# Patient Record
(demographics unavailable — no encounter records)

---

## 2024-11-15 NOTE — DISCUSSION/SUMMARY
[de-identified] : Patient allowed to gently start resuming activities. Discussed change to medication prescription and usage. Offered cortisone steroid injection. Bracing options discussed with patient. Hyaluronic Acid inj pamphlet given to pt. try topical lidocaine reviewed current medications being used by this patient   11/15/2024  RE:  MYA SALAZAR   Acct #- [id number]  Attention:  Nurse Reviewer /Medical Director  I am writing this letter as a medical necessity for HA euflexxa both knees Patient has tried analgesics, non-steroid anti-inflammatory agents,  physical therapy, hot or cold compresses,injections of corticosteroids, etc)  which in combination or by themselves has not worked. Based on my patient's condition, I strongly believe that the Hyaluronic aid injections is medically needed.   Thank you for your time and consideration.      Euflexxa today.

## 2024-11-15 NOTE — PROCEDURE
[FreeTextEntry3] : Euflexxa (Large Joint) with Ultrasound Guidance Viscosupplementation Injection: X-ray evidence of Osteoarthritis on this or prior visit and Patient has tried OTC's including aspirin, Ibuprofen, Aleve etc or prescription NSAIDS, and/or exercises at home and/ or physical therapy without satisfactory response.  An injection of Euflexxa 2ml #1 was injected into the bilat knee(s). after verbal consent using sterile technique. The risks, benefits, and alternatives to Viscosupplementation injection were explained in full to the patient. Risks outlined include but are not limited to infection, sepsis, bleeding, scarring, skin discoloration, temporary increase in pain, syncopal episode, failure to resolve symptoms, allergic reaction, and symptom recurrence. Signs and symptoms of infection reviewed and patient advised to call immediately for redness, fevers, and/or chills. Patient understood the risks. All questions were answered. After discussion of options, patient requested Viscosupplementation. Oral informed consent was obtained and sterile prep was done of the injection site. Sterile technique was used without complications. The patient tolerated the procedure well. Ice tonight to the injection site.   Ultrasound Guidance was used for the following reasons: altered anatomic landmarks because of erosive arthritis.   Ultrasound guided injection was performed of the knee, visualization of the needle and placement of injection was performed without complication

## 2024-11-15 NOTE — HISTORY OF PRESENT ILLNESS
[4] : 4 [3] : 3 [Localized] : localized [Injection therapy] : injection therapy [Walking] : walking [de-identified] : Patient has increasing smyptoms in both knees. He has a history of OA, last zilretta for the rknee was in March and help until recently on no meds and uses a cane.   [] : no [FreeTextEntry1] : knees

## 2024-11-29 NOTE — HISTORY OF PRESENT ILLNESS
[2] : 2 [Euflexxa] : Euflexxa [Gradual] : gradual [Localized] : localized [Injection therapy] : injection therapy [de-identified] : Patient has increasing smyptoms in both knees. He has a history of OA, last zilretta for the rknee was in March and help until recently on no meds and uses a cane. Tolerating Euflexxa well, no adverse reactions.   [] : no [FreeTextEntry1] : knees [FreeTextEntry5] : left knee is a 0 rt knee is a 3 [de-identified] : 11-15-24 [de-identified] : knees

## 2024-11-29 NOTE — PHYSICAL EXAM
[Left] : left knee [Negative] : negative Jeffrey's [Right] : right knee [] : patient ambulates with assistive device [TWNoteComboBox7] : flexion 120 degrees [de-identified] : extension -5 degrees

## 2024-12-06 NOTE — DISCUSSION/SUMMARY
[de-identified] : rest, ice activity modification.    Frequency of visco and CSI discussed. Return in 6 weeks as needed

## 2024-12-06 NOTE — PHYSICAL EXAM
[Left] : left knee [Negative] : negative Jeffrey's [Right] : right knee [] : patient ambulates with assistive device [TWNoteComboBox7] : flexion 120 degrees [de-identified] : extension -5 degrees

## 2024-12-06 NOTE — HISTORY OF PRESENT ILLNESS
[3] : 3 [Euflexxa] : Euflexxa [Localized] : localized [Injection therapy] : injection therapy [de-identified] : Patient has increasing smyptoms in both knees. He has a history of OA, last zilretta for the rknee was in March and help until recently on no meds and uses a cane. Tolerating Euflexxa well, no adverse reactions.  Tolerating visco well.  [] : no [FreeTextEntry1] : knees/rt> left  [de-identified] : 11-29-24 [de-identified] : knees

## 2025-03-04 NOTE — PHYSICAL EXAM
Pt arrives to the ED as a walk-in due to headache and dizziness. Pt's mother states that he was playing soccer when he began experiencing symptoms. Pt states both of his ears hurt as well. Mom is also concerned bc during his sport's physical the MD stated that he heard a murmur. Pt was to get an EKG but has not received it yet.   [Left] : left knee [Right] : right knee [Negative] : negative Jeffrey's [] : patient ambulates with assistive device [TWNoteComboBox7] : flexion 120 degrees [de-identified] : extension -5 degrees

## 2025-03-21 NOTE — PHYSICAL EXAM
[Left] : left knee [Negative] : negative Jeffrey's [Right] : right knee [Bilateral] : hip bilaterally [AP] : anteroposterior [Moderate arthritis (Tonnis Grade 2)] : Moderate arthritis (Tonnis Grade 2) [de-identified] : external rotation 10 degrees [TWNoteComboBox6] : internal rotation 10 degrees [5___] : quadriceps 5[unfilled]/5 [] : patient ambulates with assistive device [TWNoteComboBox7] : flexion 120 degrees [de-identified] : extension -5 degrees

## 2025-03-21 NOTE — PROCEDURE
[FreeTextEntry3] : Large Joint Injection / Aspiration: Lidocaine, Zilretta Ultrasound and Guidance Lidocaine: 3 cc. Needle size: 18 gauge , 1.5 inch.  Zilretta: An Injection of Zilretta 32 Units 5ml ,  was injected into bilateral knees .   Ultrasound Guidance was used Ultrasound guided injection was performed of the knee, visualization of the needle and placement of injection was performed without complication.   Large Joint Injection was performed because of pain and inflammation. Anesthesia: ethyl chloride sprayed topically.. Patient has tried OTC's including aspirin, Ibuprofen, Aleve etc or prescription NSAIDS, and/or exercises at home and/ or physical therapy without satisfactory response. After verbal consent using sterile preparation and technique. The risks, benefits, and alternatives to aspiration were explained in full to the patient. Risks outlined include but are not limited to infection, sepsis, bleeding, scarring, skin discoloration, temporary increase in pain, syncopal episode, failure to resolve symptoms, allergic reaction and symptom recurrence. Patient understood the risks. All questions were answered. After discussion of options, patient requested an aspiration. Oral informed consent was obtained and sterile prep was done of the injection site. Sterile technique was utilized for the procedure including the preparation of the solutions used for the aspiration. Patient tolerated the procedure well. Advised to ice the injection site this evening. Prep with betadine locally to site. Sterile technique used. Patient tolerated procedure well. Post Procedure Instructions: Patient was advised to call if redness, pain, or fever occur and apply ice for 15 min. out of every hour for the next 12-24 hours as tolerated. patient was advised to rest the joint(s) for 1 days.

## 2025-03-21 NOTE — DISCUSSION/SUMMARY
[de-identified] : modify activities use elastic sleeve for structural support try OTC meds ice as needed try topical lidocaine for pain control reviewed current medications used by this patient home exercises for functional return can not take mobic 15 qday as on Plavix

## 2025-03-21 NOTE — HISTORY OF PRESENT ILLNESS
[Radiating] : radiating [Injection therapy] : injection therapy [Localized] : localized [3] : 3 [Euflexxa] : Euflexxa [de-identified] : Patient has increasing smyptoms in both knees. Some swelling on left knee, uses aelastic sleeve R knee and AFO's bilat. Completed Euflexxa Dec6  no signif help, hips bother him, wife feels as if he walks  differently [] : no [FreeTextEntry1] : knees/rt> left  [de-identified] : stretching [FreeTextEntry7] : both hips and l knee crack while stretching [de-identified] : 11-29-24 [de-identified] : knees

## 2025-06-20 NOTE — PROCEDURE
[Large Joint Injection] : Large joint injection [Left] : of the left [Knee] : knee [Pain] : pain [Inflammation] : inflammation [Alcohol] : alcohol [Betadine] : betadine [Ethyl Chloride sprayed topically] : ethyl chloride sprayed topically [___ cc    1%] : Lidocaine ~Vcc of 1%  [Effusion] : effusion [] : Patient tolerated procedure well [Call if redness, pain or fever occur] : call if redness, pain or fever occur [Apply ice for 15min out of every hour for the next 12-24 hours as tolerated] : apply ice for 15 minutes out of every hour for the next 12-24 hours as tolerated [Patient was advised to rest the joint(s) for ____ days] : patient was advised to rest the joint(s) for [unfilled] days [Previous OTC use and PT nontherapeutic] : patient has tried OTC's including aspirin, Ibuprofen, Aleve, etc or prescription NSAIDS, and/or exercises at home and/or physical therapy without satisfactory response [Patient had decreased mobility in the joint] : patient had decreased mobility in the joint [Risks, benefits, alternatives discussed / Verbal consent obtained] : the risks benefits, and alternatives have been discussed, and verbal consent was obtained [All ultrasound images have been permanently captured and stored accordingly in our picture archiving and communication system] : All ultrasound images have been permanently captured and stored accordingly in our picture archiving and communication system [de-identified] : 46 ccs [de-identified] : clear [FreeTextEntry3] : Supartz (Large Joint) with Ultrasound Guidance Viscosupplementation Injection: X-ray evidence of Osteoarthritis on this or prior visit and Patient has tried OTC's including aspirin, Ibuprofen, Aleve etc or prescription NSAIDS, and/or exercises at home and/ or physical therapy without satisfactory response.  An injection of Supartz 2.5ml #_1___ was injected into the bilateral knee(s). after verbal consent using sterile technique. The risks, benefits, and alternatives to Viscosupplementation injection were explained in full to the patient. Risks outlined include but are not limited to infection, sepsis, bleeding, scarring, skin discoloration, temporary increase in pain, syncopal episode, failure to resolve symptoms, allergic reaction, and symptom recurrence. Signs and symptoms of infection reviewed and patient advised to call immediately for redness, fevers, and/or chills. Patient understood the risks. All questions were answered. After discussion of options, patient requested Viscosupplementation. Oral informed consent was obtained and sterile prep was done of the injection site. Sterile technique was used without complications. The patient tolerated the procedure well. Ice tonight to the injection site.   Ultrasound Guidance was used for the following reasons: altered anatomic landmarks because of erosive arthritis.   Ultrasound guided injection was performed of the knee, visualization of the needle and placement of injection was performed without complication.

## 2025-06-20 NOTE — DISCUSSION/SUMMARY
[de-identified] : modify activities use elastic sleeve for structural support try OTC meds ice as needed try topical lidocaine for pain control reviewed current medications used by this patient home exercises for functional return  06/20/2025  RE:  MYA SALAZAR   [facility] #- [id number]  Attention:  Nurse Reviewer /Medical Director  I am writing this letter as a medical necessity for HA esupartz both knees Patient has tried analgesics, non-steroid anti-inflammatory agents,  physical therapy, hot or cold compresses,injections of corticosteroids, etc)  which in combination or by themselves has not worked. Based on my patient's condition, I strongly believe that the Hyaluronic aid injections is medically needed.   Thank you for your time and consideration.

## 2025-06-20 NOTE — HISTORY OF PRESENT ILLNESS
[Injection therapy] : injection therapy [0] : 0 [Localized] : localized [3] : 3 [Euflexxa] : Euflexxa [de-identified] : Patient has increasing symptoms in both knees. He has a history of OA, last zilretta for the R knee was in March and help until recently on no meds and uses a cane. Euflexxa did not help much, ,on no meds [] : no [FreeTextEntry1] : knees/rt> left  [de-identified] : 11-29-24 [de-identified] : knees

## 2025-06-20 NOTE — PHYSICAL EXAM
[Left] : left knee [Negative] : negative Jeffrey's [Right] : right knee [] : patient ambulates with assistive device [TWNoteComboBox7] : flexion 120 degrees [de-identified] : extension -5 degrees

## 2025-06-27 NOTE — PROCEDURE
[FreeTextEntry3] : Supartz (Large Joint) with Ultrasound Guidance Viscosupplementation Injection: X-ray evidence of Osteoarthritis on this or prior visit and Patient has tried OTC's including aspirin, Ibuprofen, Aleve etc or prescription NSAIDS, and/or exercises at home and/ or physical therapy without satisfactory response.  An injection of Supartz 2.5ml #_2__ was injected into the bilateral knee(s). after verbal consent using sterile technique. The risks, benefits, and alternatives to Viscosupplementation injection were explained in full to the patient. Risks outlined include but are not limited to infection, sepsis, bleeding, scarring, skin discoloration, temporary increase in pain, syncopal episode, failure to resolve symptoms, allergic reaction, and symptom recurrence. Signs and symptoms of infection reviewed and patient advised to call immediately for redness, fevers, and/or chills. Patient understood the risks. All questions were answered. After discussion of options, patient requested Viscosupplementation. Oral informed consent was obtained and sterile prep was done of the injection site. Sterile technique was used without complications. The patient tolerated the procedure well. Ice tonight to the injection site.   Ultrasound Guidance was used for the following reasons: altered anatomic landmarks because of erosive arthritis.   Ultrasound guided injection was performed of the knee, visualization of the needle and placement of injection was performed without complication.

## 2025-06-27 NOTE — PHYSICAL EXAM
[Left] : left knee [Negative] : negative Jeffrey's [Right] : right knee [] : ambulation with cane [FreeTextEntry8] : sl [de-identified] : extension -5 degrees [TWNoteComboBox7] : flexion 120 degrees

## 2025-06-27 NOTE — HISTORY OF PRESENT ILLNESS
[Localized] : localized [de-identified] : Patient has OA, and for supartz #2 [0] : 0 [] : no [Injection therapy] : injection therapy [3] : 3 [Euflexxa] : Euflexxa [FreeTextEntry1] : knees/rt> left  [FreeTextEntry5] : rt knee pain level is a 2 and left knee pain level is a 4 [de-identified] : 11-29-24 [de-identified] : knees

## 2025-06-27 NOTE — DISCUSSION/SUMMARY
[de-identified] : modify activities use elastic sleeve for structural support try OTC meds ice as needed try topical lidocaine for pain control reviewed current medications used by this patient home exercises for functional return

## 2025-07-11 NOTE — PHYSICAL EXAM
[Left] : left knee [Negative] : negative Jeffrey's [Right] : right knee [] : patient ambulates with assistive device [FreeTextEntry8] : sl [TWNoteComboBox7] : flexion 120 degrees [de-identified] : extension -5 degrees

## 2025-07-11 NOTE — DISCUSSION/SUMMARY
[de-identified] : modify activities use elastic sleeve for structural support try OTC meds ice as needed try topical lidocaine for pain control reviewed current medications used by this patient home exercises for functional return

## 2025-07-11 NOTE — PROCEDURE
[FreeTextEntry3] : Supartz (Large Joint) with Ultrasound Guidance Viscosupplementation Injection: X-ray evidence of Osteoarthritis on this or prior visit and Patient has tried OTC's including aspirin, Ibuprofen, Aleve etc or prescription NSAIDS, and/or exercises at home and/ or physical therapy without satisfactory response.  An injection of Supartz 2.5ml #_3_ was injected into the bilateral knee(s). after verbal consent using sterile technique. The risks, benefits, and alternatives to Viscosupplementation injection were explained in full to the patient. Risks outlined include but are not limited to infection, sepsis, bleeding, scarring, skin discoloration, temporary increase in pain, syncopal episode, failure to resolve symptoms, allergic reaction, and symptom recurrence. Signs and symptoms of infection reviewed and patient advised to call immediately for redness, fevers, and/or chills. Patient understood the risks. All questions were answered. After discussion of options, patient requested Viscosupplementation. Oral informed consent was obtained and sterile prep was done of the injection site. Sterile technique was used without complications. The patient tolerated the procedure well. Ice tonight to the injection site.   Ultrasound Guidance was used for the following reasons: altered anatomic landmarks because of erosive arthritis.   Ultrasound guided injection was performed of the knee, visualization of the needle and placement of injection was performed without complication.

## 2025-07-11 NOTE — HISTORY OF PRESENT ILLNESS
[Injection therapy] : injection therapy [Supartz] : Supartz [4] : 4 [3] : 3 [Localized] : localized [] : no [FreeTextEntry1] : knees [de-identified] : 6-27-25 [de-identified] : knees [de-identified] : pain

## 2025-07-18 NOTE — PROCEDURE
[Large Joint Injection] : Large joint injection [Left] : of the left [Knee] : knee [Inflammation] : inflammation [Betadine] : betadine [___ cc    1%] : Lidocaine ~Vcc of 1%  [Effusion] : effusion [] : Patient tolerated procedure well [Call if redness, pain or fever occur] : call if redness, pain or fever occur [Apply ice for 15min out of every hour for the next 12-24 hours as tolerated] : apply ice for 15 minutes out of every hour for the next 12-24 hours as tolerated [Patient was advised to rest the joint(s) for ____ days] : patient was advised to rest the joint(s) for [unfilled] days [All ultrasound images have been permanently captured and stored accordingly in our picture archiving and communication system] : All ultrasound images have been permanently captured and stored accordingly in our picture archiving and communication system [de-identified] : 80 [de-identified] : clear [FreeTextEntry3] : Supartz (Large Joint) with Ultrasound Guidance Viscosupplementation Injection: X-ray evidence of Osteoarthritis on this or prior visit and Patient has tried OTC's including aspirin, Ibuprofen, Aleve etc or prescription NSAIDS, and/or exercises at home and/ or physical therapy without satisfactory response.  An injection of Supartz 2.5ml #_4_ was injected into the bilateral knee(s). after verbal consent using sterile technique. The risks, benefits, and alternatives to Viscosupplementation injection were explained in full to the patient. Risks outlined include but are not limited to infection, sepsis, bleeding, scarring, skin discoloration, temporary increase in pain, syncopal episode, failure to resolve symptoms, allergic reaction, and symptom recurrence. Signs and symptoms of infection reviewed and patient advised to call immediately for redness, fevers, and/or chills. Patient understood the risks. All questions were answered. After discussion of options, patient requested Viscosupplementation. Oral informed consent was obtained and sterile prep was done of the injection site. Sterile technique was used without complications. The patient tolerated the procedure well. Ice tonight to the injection site.   Ultrasound Guidance was used for the following reasons: altered anatomic landmarks because of erosive arthritis.   Ultrasound guided injection was performed of the knee, visualization of the needle and placement of injection was performed without complication.

## 2025-07-18 NOTE — DISCUSSION/SUMMARY
[de-identified] : modify activities use elastic sleeve for structural support try OTC meds ice as needed try topical lidocaine for pain control reviewed current medications used by this patient home exercises for functional return

## 2025-07-18 NOTE — HISTORY OF PRESENT ILLNESS
[Localized] : localized [Injection therapy] : injection therapy [Supartz] : Supartz [3] : 3 [2] : 2 [4] : 4 [] : no [FreeTextEntry1] : knees [de-identified] : 7=11=25 [de-identified] : knees [de-identified] : pain

## 2025-07-18 NOTE — PHYSICAL EXAM
[Left] : left knee [Negative] : negative Jeffrey's [Right] : right knee [] : patient ambulates with assistive device [FreeTextEntry8] : sl [TWNoteComboBox7] : flexion 120 degrees [de-identified] : extension -5 degrees

## 2025-07-25 NOTE — PHYSICAL EXAM
[Left] : left knee [Negative] : negative Jeffrey's [Right] : right knee [] : patient ambulates with assistive device [FreeTextEntry8] : sl [TWNoteComboBox7] : flexion 120 degrees [de-identified] : extension -5 degrees

## 2025-07-25 NOTE — PROCEDURE
[Large Joint Injection] : Large joint injection [Bilateral] : bilaterally of the [Inflammation] : inflammation [Betadine] : betadine [___ cc    1%] : Lidocaine ~Vcc of 1%  [Effusion] : effusion [] : Patient tolerated procedure well [Call if redness, pain or fever occur] : call if redness, pain or fever occur [Apply ice for 15min out of every hour for the next 12-24 hours as tolerated] : apply ice for 15 minutes out of every hour for the next 12-24 hours as tolerated [Patient was advised to rest the joint(s) for ____ days] : patient was advised to rest the joint(s) for [unfilled] days [de-identified] : 30 R, 70 L [de-identified] : clear [FreeTextEntry3] : Supartz (Large Joint) with Ultrasound Guidance Viscosupplementation Injection: X-ray evidence of Osteoarthritis on this or prior visit and Patient has tried OTC's including aspirin, Ibuprofen, Aleve etc or prescription NSAIDS, and/or exercises at home and/ or physical therapy without satisfactory response.  An injection of Supartz 2.5ml #_5_ was injected into the bilateral knee(s). after verbal consent using sterile technique. The risks, benefits, and alternatives to Viscosupplementation injection were explained in full to the patient. Risks outlined include but are not limited to infection, sepsis, bleeding, scarring, skin discoloration, temporary increase in pain, syncopal episode, failure to resolve symptoms, allergic reaction, and symptom recurrence. Signs and symptoms of infection reviewed and patient advised to call immediately for redness, fevers, and/or chills. Patient understood the risks. All questions were answered. After discussion of options, patient requested Viscosupplementation. Oral informed consent was obtained and sterile prep was done of the injection site. Sterile technique was used without complications. The patient tolerated the procedure well. Ice tonight to the injection site.   Ultrasound Guidance was used for the following reasons: altered anatomic landmarks because of erosive arthritis.   Ultrasound guided injection was performed of the knee, visualization of the needle and placement of injection was performed without complication.

## 2025-07-25 NOTE — DISCUSSION/SUMMARY
[de-identified] : modify activities use elastic sleeve for structural support try OTC meds ice as needed try topical lidocaine for pain control reviewed current medications used by this patient home exercises for functional return